# Patient Record
Sex: FEMALE | Race: WHITE | URBAN - METROPOLITAN AREA
[De-identification: names, ages, dates, MRNs, and addresses within clinical notes are randomized per-mention and may not be internally consistent; named-entity substitution may affect disease eponyms.]

---

## 2017-09-12 ENCOUNTER — APPOINTMENT (OUTPATIENT)
Dept: GENERAL RADIOLOGY | Facility: CLINIC | Age: 47
End: 2017-09-12
Attending: PHYSICIAN ASSISTANT
Payer: COMMERCIAL

## 2017-09-12 ENCOUNTER — HOSPITAL ENCOUNTER (EMERGENCY)
Facility: CLINIC | Age: 47
Discharge: HOME | End: 2017-09-12
Attending: PHYSICIAN ASSISTANT | Admitting: PHYSICIAN ASSISTANT
Payer: COMMERCIAL

## 2017-09-12 ENCOUNTER — APPOINTMENT (OUTPATIENT)
Dept: CT IMAGING | Facility: CLINIC | Age: 47
End: 2017-09-12
Attending: PHYSICIAN ASSISTANT
Payer: COMMERCIAL

## 2017-09-12 VITALS
BODY MASS INDEX: 19.7 KG/M2 | TEMPERATURE: 98.2 F | HEART RATE: 78 BPM | SYSTOLIC BLOOD PRESSURE: 125 MMHG | HEIGHT: 68 IN | DIASTOLIC BLOOD PRESSURE: 91 MMHG | RESPIRATION RATE: 20 BRPM | WEIGHT: 130 LBS | OXYGEN SATURATION: 98 %

## 2017-09-12 DIAGNOSIS — M54.50 ACUTE BILATERAL LOW BACK PAIN WITHOUT SCIATICA: ICD-10-CM

## 2017-09-12 DIAGNOSIS — R20.2 PARESTHESIA: ICD-10-CM

## 2017-09-12 LAB
ALBUMIN SERPL-MCNC: 3.7 G/DL (ref 3.4–5)
ALBUMIN UR-MCNC: NEGATIVE MG/DL
ALP SERPL-CCNC: 47 U/L (ref 40–150)
ALT SERPL W P-5'-P-CCNC: 17 U/L (ref 0–50)
ANION GAP SERPL CALCULATED.3IONS-SCNC: 8 MMOL/L (ref 3–14)
APPEARANCE UR: CLEAR
AST SERPL W P-5'-P-CCNC: 11 U/L (ref 0–45)
BASOPHILS # BLD AUTO: 0 10E9/L (ref 0–0.2)
BASOPHILS NFR BLD AUTO: 0.2 %
BILIRUB SERPL-MCNC: 0.3 MG/DL (ref 0.2–1.3)
BILIRUB UR QL STRIP: NEGATIVE
BUN SERPL-MCNC: 16 MG/DL (ref 7–30)
CALCIUM SERPL-MCNC: 8.4 MG/DL (ref 8.5–10.1)
CHLORIDE SERPL-SCNC: 108 MMOL/L (ref 94–109)
CO2 SERPL-SCNC: 25 MMOL/L (ref 20–32)
COLOR UR AUTO: YELLOW
CREAT SERPL-MCNC: 0.81 MG/DL (ref 0.52–1.04)
DIFFERENTIAL METHOD BLD: ABNORMAL
EOSINOPHIL # BLD AUTO: 0.1 10E9/L (ref 0–0.7)
EOSINOPHIL NFR BLD AUTO: 2.2 %
ERYTHROCYTE [DISTWIDTH] IN BLOOD BY AUTOMATED COUNT: 13.4 % (ref 10–15)
GFR SERPL CREATININE-BSD FRML MDRD: 75 ML/MIN/1.7M2
GLUCOSE SERPL-MCNC: 109 MG/DL (ref 70–99)
GLUCOSE UR STRIP-MCNC: NEGATIVE MG/DL
HCG UR QL: NEGATIVE
HCT VFR BLD AUTO: 34.8 % (ref 35–47)
HGB BLD-MCNC: 11.7 G/DL (ref 11.7–15.7)
HGB UR QL STRIP: NEGATIVE
IMM GRANULOCYTES # BLD: 0 10E9/L (ref 0–0.4)
IMM GRANULOCYTES NFR BLD: 0.2 %
KETONES UR STRIP-MCNC: ABNORMAL MG/DL
LEUKOCYTE ESTERASE UR QL STRIP: NEGATIVE
LIPASE SERPL-CCNC: 208 U/L (ref 73–393)
LYMPHOCYTES # BLD AUTO: 2 10E9/L (ref 0.8–5.3)
LYMPHOCYTES NFR BLD AUTO: 39.6 %
MCH RBC QN AUTO: 28.5 PG (ref 26.5–33)
MCHC RBC AUTO-ENTMCNC: 33.6 G/DL (ref 31.5–36.5)
MCV RBC AUTO: 85 FL (ref 78–100)
MONOCYTES # BLD AUTO: 0.4 10E9/L (ref 0–1.3)
MONOCYTES NFR BLD AUTO: 7.6 %
NEUTROPHILS # BLD AUTO: 2.5 10E9/L (ref 1.6–8.3)
NEUTROPHILS NFR BLD AUTO: 50.2 %
NITRATE UR QL: NEGATIVE
NRBC # BLD AUTO: 0 10*3/UL
NRBC BLD AUTO-RTO: 0 /100
PH UR STRIP: 5 PH (ref 5–7)
PLATELET # BLD AUTO: 166 10E9/L (ref 150–450)
POTASSIUM SERPL-SCNC: 3.6 MMOL/L (ref 3.4–5.3)
PROT SERPL-MCNC: 7 G/DL (ref 6.8–8.8)
RBC # BLD AUTO: 4.1 10E12/L (ref 3.8–5.2)
SODIUM SERPL-SCNC: 141 MMOL/L (ref 133–144)
SOURCE: ABNORMAL
SP GR UR STRIP: >1.03 (ref 1–1.03)
UROBILINOGEN UR STRIP-ACNC: 0.2 EU/DL (ref 0.2–1)
WBC # BLD AUTO: 5 10E9/L (ref 4–11)

## 2017-09-12 PROCEDURE — 99285 EMERGENCY DEPT VISIT HI MDM: CPT | Mod: 25

## 2017-09-12 PROCEDURE — 83690 ASSAY OF LIPASE: CPT | Performed by: PHYSICIAN ASSISTANT

## 2017-09-12 PROCEDURE — 74176 CT ABD & PELVIS W/O CONTRAST: CPT

## 2017-09-12 PROCEDURE — 81003 URINALYSIS AUTO W/O SCOPE: CPT | Performed by: PHYSICIAN ASSISTANT

## 2017-09-12 PROCEDURE — 72100 X-RAY EXAM L-S SPINE 2/3 VWS: CPT

## 2017-09-12 PROCEDURE — 80053 COMPREHEN METABOLIC PANEL: CPT | Performed by: PHYSICIAN ASSISTANT

## 2017-09-12 PROCEDURE — 85025 COMPLETE CBC W/AUTO DIFF WBC: CPT | Performed by: PHYSICIAN ASSISTANT

## 2017-09-12 ASSESSMENT — ENCOUNTER SYMPTOMS
ROS GI COMMENTS: NEGATIVE FOR INCONTINENCE.
BACK PAIN: 1
ABDOMINAL PAIN: 0

## 2017-09-12 NOTE — ED AVS SNAPSHOT
Emergency Department    [address] [facility] [address]    Phone:  [phone]    Fax:  [phone]                                       Addie Baron   MRN: [id number]    Department:   Emergency Department   Date of Visit:  9/12/2017           Patient Information     Date Of Birth          1970        Your diagnoses for this visit were:     Acute bilateral low back pain without sciatica     Paresthesia, all extremities        You were seen by Analy Guillaume PA-C.      Follow-up Information     Follow up with  Emergency Department.    Specialty:  EMERGENCY MEDICINE    Why:  If symptoms worsen    Contact information:    [address] [facility] [address] [phone]        Follow up with [facility] In 3 days.    Contact information:    [address]  [address]  [facility] [address]          Follow up with Ryan Ruiz MD In 3 days.    Specialty:  Orthopedics    Contact information:    [facility]  [address] [facility] [address] [phone]          Discharge Instructions       Rest, ice to back, ibuprofen.  Follow up with primary care in 3 days for recheck. Also discuss Vitamin B12 testing due to tingling.   Follow up with spine ortho if not improving in the next 5 days as further evaluation with MRI may be indicated.       Discharge Instructions  Back Pain  You were seen today for back pain. Back pain can have many causes, but most will get better without surgery or other specific treatment. Sometimes there is a herniated ( slipped ) disc. We don t usually do MRI scans to look for these right away, since most herniated discs will get better on their own with time.  Today, we did not find any evidence that your back pain was caused by a serious condition, such as an infection, fracture, or tumor. However, sometimes symptoms develop over time and cannot be found during an emergency visit, so it  is very important that you follow up with your primary doctor.  Return to the Emergency Department if:    You develop a fever with your back pain.     You have weakness or change in sensation in one or both legs.    You lose control of your bowels or bladder, or can t empty your bladder.    Your pain gets much worse.     Follow-up with your doctor:    Unless your pain has completely gone away, please make an appointment with your doctor within one week.  You may need further management of your back pain, such as more pain medication, imaging such as an X-ray or MRI, or physical therapy.    What can I do to help myself?    Remain Active -- People are often afraid that they will hurt their back further or delay recovery by remaining active, but this is one of the best things you can do for your back. In fact, prolonged bed rest is not recommended. Studies have shown that people with low back pain recover faster when they remain active. Movement helps to bring blood flow to the muscles and relieve muscle spasms as well as preventing loss of muscle strength.    Heat -- Using a heating pad can help with low back pain during the first few weeks. Do not sleep with a heating pad, as you can be burned.     Pain medications - You may take a pain medication such as Tylenol  (acetaminophen), Advil , Nuprin  (ibuprofen) or Aleve  (naproxen).  If you have been given a narcotic such as Vicodin  (hydrocodone with acetaminophen), Percocet  (oxycodone with acetaminophen), codeine, or a muscle relaxant such as Flexeril  (cyclobenzaprine) or Soma  (carisoprodol), do not drive for four hours after you have taken it. If the narcotic contains Tylenol  (acetaminophen), do not take Tylenol  with it. All narcotics will cause constipation, so eat a high fiber diet.   If you were given a prescription for medicine here today, be sure to read all of the information (including the package insert) that comes with your prescription.  This will  include important information about the medicine, its side effects, and any warnings that you need to know about.  The pharmacist who fills the prescription can provide more information and answer questions you may have about the medicine.  If you have questions or concerns that the pharmacist cannot address, please call or return to the Emergency Department.   Remember that you can always come back to the Emergency Department if you are not able to see your regular doctor in the amount of time listed above, if you get any new symptoms, or if there is anything that worries you.          24 Hour Appointment Hotline       To make an appointment at any [facility], call [phone] ([phone]). If you don't have a family doctor or clinic, we will help you find one. [facility] clinics are conveniently located to serve the needs of you and your family.             Review of your medicines      Notice     You have not been prescribed any medications.            Procedures and tests performed during your visit     *UA reflex to Microscopic    Abd/pelvis CT no contrast - Stone Protocol    CBC with platelets + differential    Comprehensive metabolic panel    HCG qualitative urine    IV access    Lipase    Lumbar spine XR, 2-3 views      Orders Needing Specimen Collection     None      Pending Results     Date and Time Order Name Status Description    9/12/2017 2104 Lumbar spine XR, 2-3 views Preliminary             Pending Culture Results     No orders found from 9/10/2017 to 9/13/2017.            Pending Results Instructions     If you had any lab results that were not finalized at the time of your Discharge, you can call the ED Lab Result RN at [phone]. You will be contacted by this team for any positive Lab results or changes in treatment. The nurses are available 7 days a week from 10A to 6:30P.  You can leave a message 24 hours per day and they will return your call.        Test Results From Your Hospital  Stay        9/12/2017  9:28 PM      Component Results     Component Value Ref Range & Units Status    WBC 5.0 4.0 - 11.0 10e9/L Final    RBC Count 4.10 3.8 - 5.2 10e12/L Final    Hemoglobin 11.7 11.7 - 15.7 g/dL Final    Hematocrit 34.8 (L) 35.0 - 47.0 % Final    MCV 85 78 - 100 fl Final    MCH 28.5 26.5 - 33.0 pg Final    MCHC 33.6 31.5 - 36.5 g/dL Final    RDW 13.4 10.0 - 15.0 % Final    Platelet Count 166 150 - 450 10e9/L Final    Diff Method Automated Method  Final    % Neutrophils 50.2 % Final    % Lymphocytes 39.6 % Final    % Monocytes 7.6 % Final    % Eosinophils 2.2 % Final    % Basophils 0.2 % Final    % Immature Granulocytes 0.2 % Final    Nucleated RBCs 0 0 /100 Final    Absolute Neutrophil 2.5 1.6 - 8.3 10e9/L Final    Absolute Lymphocytes 2.0 0.8 - 5.3 10e9/L Final    Absolute Monocytes 0.4 0.0 - 1.3 10e9/L Final    Absolute Eosinophils 0.1 0.0 - 0.7 10e9/L Final    Absolute Basophils 0.0 0.0 - 0.2 10e9/L Final    Abs Immature Granulocytes 0.0 0 - 0.4 10e9/L Final    Absolute Nucleated RBC 0.0  Final         9/12/2017  9:45 PM      Component Results     Component Value Ref Range & Units Status    Sodium 141 133 - 144 mmol/L Final    Potassium 3.6 3.4 - 5.3 mmol/L Final    Chloride 108 94 - 109 mmol/L Final    Carbon Dioxide 25 20 - 32 mmol/L Final    Anion Gap 8 3 - 14 mmol/L Final    Glucose 109 (H) 70 - 99 mg/dL Final    Urea Nitrogen 16 7 - 30 mg/dL Final    Creatinine 0.81 0.52 - 1.04 mg/dL Final    GFR Estimate 75 >60 mL/min/1.7m2 Final    Non  GFR Calc    GFR Estimate If Black >90 >60 mL/min/1.7m2 Final    African American GFR Calc    Calcium 8.4 (L) 8.5 - 10.1 mg/dL Final    Bilirubin Total 0.3 0.2 - 1.3 mg/dL Final    Albumin 3.7 3.4 - 5.0 g/dL Final    Protein Total 7.0 6.8 - 8.8 g/dL Final    Alkaline Phosphatase 47 40 - 150 U/L Final    ALT 17 0 - 50 U/L Final    AST 11 0 - 45 U/L Final         9/12/2017  9:42 PM      Component Results     Component Value Ref Range & Units  Status    Lipase 208 73 - 393 U/L Final         9/12/2017  9:33 PM      Component Results     Component Value Ref Range & Units Status    HCG Qual Urine Negative NEG^Negative Final    This test is for screening purposes.  Results should be interpreted along with   the clinical picture.  Confirmation testing is available if warranted by   ordering [id number], HCG Quantitative Pregnancy.           9/12/2017 10:27 PM      Narrative     CT ABDOMEN AND PELVIS WITHOUT CONTRAST  9/12/2017 9:31 PM     INDICATION: Low back pain, greater on left side.    TECHNIQUE: Thin axial images through the abdomen and pelvis without  contrast. Coronal reformatted images. Radiation dose for this scan was  reduced using automated exposure control, adjustment of the mA and/or  kV according to patient size, or iterative reconstruction technique.    COMPARISON: None.    FINDINGS: No urinary stones or hydronephrosis. Liver, gallbladder,  spleen, pancreas, adrenal glands and kidneys are negative without  benefit of contrast.    Uterus is present. Possible trace fluid in the right pelvis, within  normal physiologic limits. No inflammatory changes demonstrated. No  bowel obstruction, free air or other acute findings. No destructive  bone lesions. Mild curve convex to the left lumbar spine.        Impression     IMPRESSION:  1. No urinary stones or hydronephrosis.  2. No other acute findings within limits of a noncontrast scan.  3. Possible trace pelvic fluid, within normal physiologic limits.    PAUL GUSMAN MD         9/12/2017 10:01 PM      Narrative     XR LUMBAR SPINE 2-3 VIEWS  9/12/2017 9:50 PM     HISTORY:  low back discomfort    COMPARISON: None.    FINDINGS:  There is curvature of the spine convex towards the left. No  fractures are identified. Degenerative changes present with small  anterior osteophytes at L2-3, L3-4, and L4-5.        Impression     IMPRESSION:  1. No acute fractures.  2. Mild degenerative change.         9/12/2017   9:28 PM      Component Results     Component Value Ref Range & Units Status    Color Urine Yellow  Final    Appearance Urine Clear  Final    Glucose Urine Negative NEG^Negative mg/dL Final    Bilirubin Urine Negative NEG^Negative Final    Ketones Urine Trace (A) NEG^Negative mg/dL Final    Specific Gravity Urine >1.030 1.003 - 1.035 Final    Blood Urine Negative NEG^Negative Final    pH Urine 5.0 5.0 - 7.0 pH Final    Protein Albumin Urine Negative NEG^Negative mg/dL Final    Urobilinogen Urine 0.2 0.2 - 1.0 EU/dL Final    Nitrite Urine Negative NEG^Negative Final    Leukocyte Esterase Urine Negative NEG^Negative Final    Source Midstream Urine  Final                Clinical Quality Measure: Blood Pressure Screening     Your blood pressure was checked while you were in the emergency department today. The last reading we obtained was  BP: 141/88 . Please read the guidelines below about what these numbers mean and what you should do about them.  If your systolic blood pressure (the top number) is less than 120 and your diastolic blood pressure (the bottom number) is less than 80, then your blood pressure is normal. There is nothing more that you need to do about it.  If your systolic blood pressure (the top number) is 120-139 or your diastolic blood pressure (the bottom number) is 80-89, your blood pressure may be higher than it should be. You should have your blood pressure rechecked within a year by a primary care provider.  If your systolic blood pressure (the top number) is 140 or greater or your diastolic blood pressure (the bottom number) is 90 or greater, you may have high blood pressure. High blood pressure is treatable, but if left untreated over time it can put you at risk for heart attack, stroke, or kidney failure. You should have your blood pressure rechecked by a primary care provider within the next 4 weeks.  If your provider in the emergency department today gave you specific instructions to follow-up  "with your doctor or provider even sooner than that, you should follow that instruction and not wait for up to 4 weeks for your follow-up visit.        Thank you for choosing [facility]       Thank you for choosing [facility] for your care. Our goal is always to provide you with excellent care. Hearing back from our patients is one way we can continue to improve our services. Please take a few minutes to complete the written survey that you may receive in the mail after you visit with us. Thank you!        [employer] Information     [employer] lets you send messages to your doctor, view your test results, renew your prescriptions, schedule appointments and more. To sign up, go to [email or web address] . Click on \"Log in\" on the left side of the screen, which will take you to the Welcome page. Then click on \"Sign up Now\" on the right side of the page.     You will be asked to enter the access code listed below, as well as some personal information. Please follow the directions to create your username and password.     Your access code is: [email or web address]  Expires: 2017 10:33 PM     Your access code will  in 90 days. If you need help or a new code, please call your [facility] clinic or [phone].        Care EveryWhere ID     This is your Care EveryWhere ID. This could be used by other organizations to access your [facility] medical records  [id number]        Equal Access to Services     FREEDOM DUBON : Hadisidra Eden, waaxda héctor, qaybta jessicaalanamika tejada . So [facility] [phone].    ATENCIÓN: Si habla español, tiene a warner disposición servicios gratuitos de asistencia lingüística. Miguel A al [phone].    We comply with applicable federal civil rights laws and [place] laws. We do not discriminate on the basis of race, color, national origin, age, disability sex, sexual orientation or gender identity.            After Visit Summary       This is your " record. Keep this with you and show to your community pharmacist(s) and doctor(s) at your next visit.

## 2017-09-12 NOTE — ED AVS SNAPSHOT
Emergency Department    [address] [facility] [address]    Phone:  [phone]    Fax:  [phone]                                       Addie Baron   MRN: [id number]    Department:   Emergency Department   Date of Visit:  9/12/2017           After Visit Summary Signature Page     I have received my discharge instructions, and my questions have been answered. I have discussed any challenges I see with this plan with the nurse or doctor.    ..........................................................................................................................................  Patient/Patient Representative Signature      ..........................................................................................................................................  Patient Representative Print Name and Relationship to Patient    ..................................................               ................................................  Date                                            Time    ..........................................................................................................................................  Reviewed by Signature/Title    ...................................................              ..............................................  Date                                                            Time

## 2017-09-13 NOTE — ED PROVIDER NOTES
History     Chief Complaint:  Lower Back Pain      HPI   Addie Baron is a 47 year old female who presents to the emergency department today for evaluation of lower back pain. The patient states that for the past month she has been experiencing an internal pain and pressure in her lower back that is worse in the morning or when laying down. She endorses the pain radiating down her leg and into her pelvic area. She denies any injuries or new activities, though she notes that she has been working on her basement. The patient states that she was seen at the [facility] ED in July for abdominal pain where they found an ovarian cyst that has since resolved itself. However, she notes that this morning around 0500 she was woken up with pain in back that felt like a past kidney stone. This resolved, but she is concerned about the lower back pain so she came to the ED this evening. Additionally the patient states that she has felt some associated tingling in her upper and lower extremities. She denies any abdominal pain, saddle anesthesia, or bowel or bladder incontinence.     Allergies:  No Known Drug Allergies     Medications:    The patient is currently on no regular medications.       Past Medical History:    History reviewed.  No significant past medical history.     Past Surgical History:    History reviewed.  No significant past surgical history.     Family History:    History reviewed.  No significant family history.     Social History:  Smoking Status: negative     Review of Systems   Gastrointestinal: Negative for abdominal pain.        Negative for incontinence.    Genitourinary:        Negative for incontinence.   Musculoskeletal: Positive for back pain.   Neurological:        Positive for tingling.   All other systems reviewed and are negative.    Physical Exam   First Vitals:  Patient Vitals for the past 24 hrs:   BP Temp Temp src Pulse Resp SpO2 Height Weight   09/12/17 2045 141/88 98.2  F (36.8  " C) Oral 78 20 98 % 1.727 m (5' 8\") 59 kg (130 lb)     Physical Exam  Nursing note and vitals reviewed.     GENERAL: Alert, mild distress, non toxic appearing.   HEENT: Normal conjunctiva. No scleral icterus.   NECK: Supple, normal range of motion.  CARDIAC: Normal rate and regular rhythm. Normal heart sounds. No murmurs, rubs, or gallops appreciated. Intact distal pedal pulses 2+ and symmetric.   PULMONARY: CTA bilaterally. Normal breath sounds. No wheezing, crackles, or rhonchi appreciated.   ABDOMEN: Soft, non-tender, non-distended. No rebound or guarding.   NEURO: Alert and oriented. No cranial nerve deficit. Sensation intact throughout including saddle area. 5/5 strength of bilateral upper and lower extremities. Active DF/PF intact. Normal gait.   MUSCULOSKELETAL: No peripheral edema. No CVA tenderness. Mild tenderness over lower lumbar spine, no overlying erythema, edema or warmth over the spine. No pilonidal cyst present.   SKIN: Skin is warm and dry. No rashes. No pallor or jaundice.   PSYCH: Normal affect and mood.     Emergency Department Course   Imaging:  Radiographic findings were communicated with the patient who voiced understanding of the findings.    Abd/pelvis CT no contrast- stone protocol:  1. No urinary stones or hydronephrosis.  2. No other acute findings within limits of a noncontrast scan.  3. Possible trace pelvic fluid, within normal physiologic limits. As per radiology.    Lumbar spine XR, 2-3 views:  1. No acute fractures.  2. Mild degenerative change. As per radiology.    Laboratory:  UA with Microscopic: Trace URINEKETON (A)  HCG Qualitative: Negative    Lipase: 208    CBC: HCT 34.8 (L) o/w WNL. (WBC 5.0, HGB 11.7, )   CMP: Glucose 109 (H), Calcium 8.4 (L) o/w WNL. (Creatinine: 0.81)      Emergency Department Course:  Nursing notes and vitals reviewed. I performed an exam of the patient as documented above.     Blood drawn. This was sent to the lab for further testing, results " above.    The patient provided a urine sample here in the emergency department. This was sent for laboratory testing, findings above.     The patient was sent for the following imaging studies while in the emergency department: Abd/pelvis CT no contrast- stone protocol, Lumbar spine XR, 2-3 views.     Findings and plan explained to the Patient. Patient discharged home with instructions regarding supportive care, medications, and reasons to return. The importance of close follow-up was reviewed.    Impression & Plan    Medical Decision Making:  Addie Baron is a 47 year old female who presents with concern for approximately 4-weeks of low back discomfort which she describes as a pressure sensation. She also reports intermittent tingling in her upper and lower extremities. Here she is afebrile, hemodynamically stable, and non-toxic appearing. The patient did not have any high risk features, including fever, saddle anesthesia, or bowel or bladder dysfunction. There is no clinical evidence of acute cord compression, cauda equina syndrome, discitis, spinal hematoma or epidural abscess. She did comment on some tingling in her legs but this has been intermittent and also in her hands. She is neurovascularly intact on exam. Given this, no other red flags and the fact this has been ongoing for the past month, I do not feel emergent MRI is indicated due to my low suspicion for the above etiologies. Xray of lumbar spine does show some degenerative changes but no signs of acute fracture, subluxation or concerning bony mass. She has no abdominal tenderness, making any acute intraabdominal process unlikely. She recently had a follow up ultrasound (due to prior history of ovarian cyst) 6 days ago (9/6) that was normal. Given this, no abdominal tenderness, and the fact her pain was present over past four weeks, no indication for repeat ultrasound today. With her history of kidney stones, I did obtain CT stone  protocol. This showed trace physiologic fluid in pelvis but no signs of hydronephrosis, ureteral stone, concerning bony lesion, or any other acute intraabdominal process to explain her pain. She is not pregnant. Urine is negative for signs of infection. Kidney function WNL. No evidence of acute electrolyte abnormality or anemia. No signs of pilonidal cyst on exam.     At this time, the etiology of her pain is unclear, possibly piriformis syndrome/musculoskeletal or related to her mild degenerative changes in the spine. I did advise her to rest, try ibuprofen or tylenol for pain and ice or heat to the back. Recommended close follow up with primary care provider this week for recheck and possible further testing including vitamin D and vitamin B12. I also provided contact information for ortho spine as she may require further outpatient imaging if not improving. The patient was advised to avoid heavy lifting, bending, or twisting. Indications for return to seek emergent evaluation were reviewed, including increasing pain, numbness, focal weakness, bowel or bladder dysfunction, high fevers, or any other new concerns. The patient was in agreement with plan and discharged in satisfactory condition with all questions answered.      Diagnosis:    ICD-10-CM    1. Acute bilateral low back pain without sciatica M54.5    2. Paresthesia, all extremities R20.2       Disposition:  Discharged to home.     Kate GONZALEZ, am serving as a scribe on 9/12/2017 at 8:53 PM to personally document services performed by Analy Guillaume PA-C based on my observations and the provider's statements to me.     Kate Varghese  9/12/2017    EMERGENCY DEPARTMENT       Analy Guillaume PA-C  09/13/17 0909

## 2017-09-13 NOTE — DISCHARGE INSTRUCTIONS
Rest, ice to back, ibuprofen.  Follow up with primary care in 3 days for recheck. Also discuss Vitamin B12 testing due to tingling.   Follow up with spine ortho if not improving in the next 5 days as further evaluation with MRI may be indicated.       Discharge Instructions  Back Pain  You were seen today for back pain. Back pain can have many causes, but most will get better without surgery or other specific treatment. Sometimes there is a herniated ( slipped ) disc. We don t usually do MRI scans to look for these right away, since most herniated discs will get better on their own with time.  Today, we did not find any evidence that your back pain was caused by a serious condition, such as an infection, fracture, or tumor. However, sometimes symptoms develop over time and cannot be found during an emergency visit, so it is very important that you follow up with your primary doctor.  Return to the Emergency Department if:    You develop a fever with your back pain.     You have weakness or change in sensation in one or both legs.    You lose control of your bowels or bladder, or can t empty your bladder.    Your pain gets much worse.     Follow-up with your doctor:    Unless your pain has completely gone away, please make an appointment with your doctor within one week.  You may need further management of your back pain, such as more pain medication, imaging such as an X-ray or MRI, or physical therapy.    What can I do to help myself?    Remain Active -- People are often afraid that they will hurt their back further or delay recovery by remaining active, but this is one of the best things you can do for your back. In fact, prolonged bed rest is not recommended. Studies have shown that people with low back pain recover faster when they remain active. Movement helps to bring blood flow to the muscles and relieve muscle spasms as well as preventing loss of muscle strength.    Heat -- Using a heating pad can help with  low back pain during the first few weeks. Do not sleep with a heating pad, as you can be burned.     Pain medications - You may take a pain medication such as Tylenol  (acetaminophen), Advil , Nuprin  (ibuprofen) or Aleve  (naproxen).  If you have been given a narcotic such as Vicodin  (hydrocodone with acetaminophen), Percocet  (oxycodone with acetaminophen), codeine, or a muscle relaxant such as Flexeril  (cyclobenzaprine) or Soma  (carisoprodol), do not drive for four hours after you have taken it. If the narcotic contains Tylenol  (acetaminophen), do not take Tylenol  with it. All narcotics will cause constipation, so eat a high fiber diet.   If you were given a prescription for medicine here today, be sure to read all of the information (including the package insert) that comes with your prescription.  This will include important information about the medicine, its side effects, and any warnings that you need to know about.  The pharmacist who fills the prescription can provide more information and answer questions you may have about the medicine.  If you have questions or concerns that the pharmacist cannot address, please call or return to the Emergency Department.   Remember that you can always come back to the Emergency Department if you are not able to see your regular doctor in the amount of time listed above, if you get any new symptoms, or if there is anything that worries you.

## 2017-09-29 ENCOUNTER — HOSPITAL ENCOUNTER (EMERGENCY)
Facility: CLINIC | Age: 47
Discharge: HOME | End: 2017-09-29
Attending: EMERGENCY MEDICINE | Admitting: EMERGENCY MEDICINE
Payer: COMMERCIAL

## 2017-09-29 VITALS
BODY MASS INDEX: 19.7 KG/M2 | WEIGHT: 130 LBS | DIASTOLIC BLOOD PRESSURE: 82 MMHG | HEIGHT: 68 IN | HEART RATE: 74 BPM | OXYGEN SATURATION: 100 % | RESPIRATION RATE: 15 BRPM | SYSTOLIC BLOOD PRESSURE: 143 MMHG | TEMPERATURE: 98.4 F

## 2017-09-29 DIAGNOSIS — M54.50 ACUTE BILATERAL LOW BACK PAIN WITHOUT SCIATICA: Primary | ICD-10-CM

## 2017-09-29 PROCEDURE — 99282 EMERGENCY DEPT VISIT SF MDM: CPT

## 2017-09-29 ASSESSMENT — ENCOUNTER SYMPTOMS
DYSURIA: 0
UNEXPECTED WEIGHT CHANGE: 0
SHORTNESS OF BREATH: 0
BACK PAIN: 1
ABDOMINAL PAIN: 0
FEVER: 0

## 2017-09-29 NOTE — ED AVS SNAPSHOT
Emergency Department    [address] [facility] [address]    Phone:  [phone]    Fax:  [phone]                                       Addie Baron   MRN: [id number]    Department:   Emergency Department   Date of Visit:  9/29/2017           Patient Information     Date Of Birth          1970        Your diagnoses for this visit were:     Acute bilateral low back pain without sciatica        You were seen by Tyrone Denise MD.      Follow-up Information     Go to Your Primary Care Physician.    Why:  at previously scheduled appointment to discuss lingering back pain symptoms. Consider additional imaging with lumbar spine MRI.        Follow up with  Emergency Department.    Specialty:  EMERGENCY MEDICINE    Why:  If symptoms worsen    Contact information:    [address] [id number] [phone]        Discharge Instructions       Discharge Instructions  Back Pain  You were seen today for back pain. Back pain can have many causes, but most will get better without surgery or other specific treatment. Sometimes there is a herniated ( slipped ) disc. We do not usually do MRI scans to look for these right away, since most herniated discs will get better on their own with time.  Today, we did not find any evidence that your back pain was caused by a serious condition. However, sometimes symptoms develop over time and cannot be found during an emergency visit, so it is very important that you follow up with your primary provider.  Generally, every Emergency Department visit should have a follow-up clinic visit with either a primary or a specialty clinic/provider. Please follow-up as instructed by your emergency provider today.    Return to the Emergency Department if:    You develop a fever with your back pain.     You have weakness or change in sensation in one or both legs.    You lose control of your bowels or bladder, or cannot empty your  bladder (cannot pee).    Your pain gets much worse.     Follow-up with your provider:    Unless your pain has completely gone away, please make an appointment with your provider within one week. Most of the routine care for back pain is available in a clinic and not the Emergency Department. You may need further management of your back pain, such as more pain medication, imaging such as an X-ray or MRI, or physical therapy.    What can I do to help myself?    Remain Active -- People are often afraid that they will hurt their back further or delay recovery by remaining active, but this is one of the best things you can do for your back. In fact, staying in bed for a long time to rest is not recommended. Studies have shown that people with low back pain recover faster when they remain active. Movement helps to bring blood flow to the muscles and relieve muscle spasms as well as preventing loss of muscle strength.    Heat -- Using a heating pad can help with low back pain during the first few weeks. Do not sleep with a heating pad, as you can be burned.     Pain medications - You may take a pain medication such as Tylenol  (acetaminophen), Advil , Motrin  (ibuprofen) or Aleve  (naproxen).  If you were given a prescription for medicine here today, be sure to read all of the information (including the package insert) that comes with your prescription.  This will include important information about the medicine, its side effects, and any warnings that you need to know about.  The pharmacist who fills the prescription can provide more information and answer questions you may have about the medicine.  If you have questions or concerns that the pharmacist cannot address, please call or return to the Emergency Department.   Remember that you can always come back to the Emergency Department if you are not able to see your regular provider in the amount of time listed above, if you get any new symptoms, or if there is anything  that worries you.      24 Hour Appointment Hotline       To make an appointment at any [facility], call [phone] ([phone]). If you don't have a family doctor or clinic, we will help you find one. [facility] are conveniently located to serve the needs of you and your family.             Review of your medicines      Notice     You have not been prescribed any medications.            Orders Needing Specimen Collection     None      Pending Results     No orders found from 9/27/2017 to 9/30/2017.            Pending Culture Results     No orders found from 9/27/2017 to 9/30/2017.            Pending Results Instructions     If you had any lab results that were not finalized at the time of your Discharge, you can call the ED Lab Result RN at [phone]. You will be contacted by this team for any positive Lab results or changes in treatment. The nurses are available 7 days a week from 10A to 6:30P.  You can leave a message 24 hours per day and they will return your call.        Test Results From Your Hospital Stay               Clinical Quality Measure: Blood Pressure Screening     Your blood pressure was checked while you were in the emergency department today. The last reading we obtained was  BP: 143/82 . Please read the guidelines below about what these numbers mean and what you should do about them.  If your systolic blood pressure (the top number) is less than 120 and your diastolic blood pressure (the bottom number) is less than 80, then your blood pressure is normal. There is nothing more that you need to do about it.  If your systolic blood pressure (the top number) is 120-139 or your diastolic blood pressure (the bottom number) is 80-89, your blood pressure may be higher than it should be. You should have your blood pressure rechecked within a year by a primary care provider.  If your systolic blood pressure (the top number) is 140 or greater or your diastolic blood pressure (the bottom  "number) is 90 or greater, you may have high blood pressure. High blood pressure is treatable, but if left untreated over time it can put you at risk for heart attack, stroke, or kidney failure. You should have your blood pressure rechecked by a primary care provider within the next 4 weeks.  If your provider in the emergency department today gave you specific instructions to follow-up with your doctor or provider even sooner than that, you should follow that instruction and not wait for up to 4 weeks for your follow-up visit.        Thank you for choosing [facility]       Thank you for choosing [facility] for your care. Our goal is always to provide you with excellent care. Hearing back from our patients is one way we can continue to improve our services. Please take a few minutes to complete the written survey that you may receive in the mail after you visit with us. Thank you!        [employer] Information     [employer] lets you send messages to your doctor, view your test results, renew your prescriptions, schedule appointments and more. To sign up, go to [email or web address] . Click on \"Log in\" on the left side of the screen, which will take you to the Welcome page. Then click on \"Sign up Now\" on the right side of the page.     You will be asked to enter the access code listed below, as well as some personal information. Please follow the directions to create your username and password.     Your access code is: [email or web address]  Expires: 2017 10:33 PM     Your access code will  in 90 days. If you need help or a new code, please call your [facility] clinic or [phone].        Care EveryWhere ID     This is your Care EveryWhere ID. This could be used by other organizations to access your [facility] medical records  [id number]        Equal Access to Services     FREEDOM DUBON : kylah Dior qaybta kaalmada adeegyada, waxay idiin hayaan adeeg kharash la'aan ah. So wamaría elena " [phone].    ATENCIÓN: Si habla español, tiene a warner disposición servicios gratuitos de asistencia lingüística. Llame al [phone].    We comply with applicable federal civil rights laws and [place] laws. We do not discriminate on the basis of race, color, national origin, age, disability, sex, sexual orientation, or gender identity.            After Visit Summary       This is your record. Keep this with you and show to your community pharmacist(s) and doctor(s) at your next visit.

## 2017-09-29 NOTE — ED AVS SNAPSHOT
Emergency Department    [address] [facility] [address]    Phone:  [phone]    Fax:  [phone]                                       Addie Baron   MRN: [id number]    Department:   Emergency Department   Date of Visit:  9/29/2017           After Visit Summary Signature Page     I have received my discharge instructions, and my questions have been answered. I have discussed any challenges I see with this plan with the nurse or doctor.    ..........................................................................................................................................  Patient/Patient Representative Signature      ..........................................................................................................................................  Patient Representative Print Name and Relationship to Patient    ..................................................               ................................................  Date                                            Time    ..........................................................................................................................................  Reviewed by Signature/Title    ...................................................              ..............................................  Date                                                            Time

## 2017-09-30 NOTE — ED PROVIDER NOTES
History     Chief Complaint:  Back Pain     HPI   Addie Baron is a 47 year old female with a history of ovarian cysts who presents to the emergency department for evaluation of back pain. The patient states that she has had several weeks of atraumatic, daily low back/tailbone pain, which is generally exacerbated by laying flat and has associated shooting pains radiating down her bilateral lower extremities. The persistence and gradual worsening of her low back pain was concerning to her and prompted her ED visit today.     The patient has been taking ibuprofen intermittently for this issue, with some relief. She denies any recent fevers, unexpected weight change, chest pain, shortness of breath, abdominal pain, bladder or bowel incontinence, dysuria, vaginal bleeding or discharge, saddle anesthesia, familial history of colon cancer, or personal history of IV drug use. The patient denies any recent known injury or trauma to her back, though does state that she is currently remodeling her basement and has been doing more heavy lifting as of late. Of note, the patient states that she is an appointment with a primary care provider for this issue later this week.     Allergies:  NKDA     Medications:    The patient is currently on no regular medications.      Past Medical History:    Ovarian cysts    Past Surgical History:    The patient does not have any pertinent past surgical history  Family / Social History:    No past pertinent family history.    Social History:  Presents alone.   Negative for tobacco use.  Marital Status:   [2]    Review of Systems   Constitutional: Negative for fever and unexpected weight change.   Respiratory: Negative for shortness of breath.    Cardiovascular: Negative for chest pain.   Gastrointestinal: Negative for abdominal pain.   Genitourinary: Negative for dysuria, vaginal bleeding and vaginal discharge.   Musculoskeletal: Positive for back pain.        Positive  "for bilateral lower extremity discomfort.    All other systems reviewed and are negative.    Physical Exam   First Vitals:  BP: 143/82  Pulse: 74  Temp: 98.4  F (36.9  C)  Resp: 15  Height: 172.7 cm (5' 8\")  Weight: 59 kg (130 lb)  SpO2: 100 %      Physical Exam  General: The patient appears comfortable, anxious at bedside  Head:  The scalp, face, and head appear normal  Eyes:  The pupils are equal, round, and reactive to light    There is no nystagmus    Extraocular muscles are intact    Conjunctivae and sclerae are normal  ENT:    The nose is normal    Pinnae are normal    The oropharynx is normal    Uvula is in the midline  Neck:  Normal range of motion    There is no midline cervical spine pain/tenderness  CV:  Regular rate and underlying rhythm     Normal S1 and S2    No S3 or S4    No pathological murmur detected  Resp:  Lungs are clear    There is no tachypnea    Non-labored breathing    No rales    No wheezing   GI:  Abdomen is soft, there is no rigidity    No distension    No tympani    No rebound tenderness     Non-surgical without peritoneal features  MS:  Back:    The cervical and thoracic spine is non-tender in the midline    The lumbar spine is non-tender in the midline    There is no lumbar paraspinous muscular pain to palpation    Legs:    There is normal motor strength of bilateral lower extremities    There is no sensory abnormality/paresthesia    There is no numbness    There is no radiculopathy    There is normal capillary refill to the toes  Skin:  No rash or acute skin lesions noted.  No shingles noted.  Neuro:  Speech is normal and fluent  Psych: Awake. Alert.  Normal affect.  Appropriate interactions.  Lymph: No anterior or posterior cervical lymphadenopathy noted      Emergency Department Course   Emergency Department Course:  Nursing notes and vitals reviewed. 2150 I performed an exam of the patient as documented above.     Findings and plan explained to the Patient. Patient discharged home " with instructions regarding supportive care, medications, and reasons to return. The importance of close follow-up was reviewed.     Impression & Plan    Medical Decision Making:  Addie Baron presented with back pain and intermittent bilateral radicular symptoms.  She did not sustain any trauma, therefore x-rays are not necessary due to the low likelihood of fracture or subluxation. Patient also recently with L spine xrays and CT abd/pelvis on Sept 12, 2017 with no acute findings noted on either exam.      Advanced imaging with CT/MRI is not indicated at this time, but may be indicated in the future if symptoms fail to resolve.  The patient has not had a fever, saddle/perineal anesthesia, bilateral foot numbness, or bowel or bladder dysfunction.  There is no clinical evidence of cauda equina syndrome, discitis, spinal/epidural space hematoma or abscess. The neurological exam is normal, with the exception of the dermatomal symptoms noted herein.      She has no abdominal pain, flank pain, dysuria, vaginal discharge/bleeding, low concern for intra-abdominal pathologies or sinister pathologies, thus no additional laboratory or urinalysis work up followed.    Addie was advised that radiculopathy often takes significant time to resolve, and that she should follow up with primary care later this week as scheduled. She will be discharged with recommendation to use ibuprofen or Tylenol as needed for pain..  The patient was instructed to avoid heavy lifting, bending or twisting.  It was noted that the patient should return for increasing pain, muscular weakness, or bowel or bladder dysfunction.  Will establish care with PCP this week and consider MRI if symptoms progress or worsen over the next weeks/months.    Diagnosis:    ICD-10-CM   1. Acute bilateral low back pain without sciatica M54.5     Disposition:  discharged to home    Sadia GONZALEZ am serving as a scribe on 9/29/2017 at 9:49 PM to personally  document services performed by Tyrone Denise MD based on my observations and the provider's statements to me.     Sadia De La Vega  9/29/2017    EMERGENCY DEPARTMENT       Tyrone Denise MD  09/30/17 0055

## 2018-01-28 ENCOUNTER — HOSPITAL ENCOUNTER (EMERGENCY)
Facility: CLINIC | Age: 48
Discharge: HOME | End: 2018-01-28
Attending: EMERGENCY MEDICINE | Admitting: EMERGENCY MEDICINE
Payer: COMMERCIAL

## 2018-01-28 VITALS
HEIGHT: 68 IN | DIASTOLIC BLOOD PRESSURE: 78 MMHG | SYSTOLIC BLOOD PRESSURE: 131 MMHG | OXYGEN SATURATION: 98 % | WEIGHT: 130 LBS | TEMPERATURE: 98.1 F | BODY MASS INDEX: 19.7 KG/M2 | HEART RATE: 93 BPM | RESPIRATION RATE: 16 BRPM

## 2018-01-28 DIAGNOSIS — B34.9 VIRAL SYNDROME: ICD-10-CM

## 2018-01-28 PROCEDURE — 99284 EMERGENCY DEPT VISIT MOD MDM: CPT | Mod: 25

## 2018-01-28 PROCEDURE — 25000132 ZZH RX MED GY IP 250 OP 250 PS 637: Performed by: EMERGENCY MEDICINE

## 2018-01-28 PROCEDURE — 25000128 H RX IP 250 OP 636: Performed by: EMERGENCY MEDICINE

## 2018-01-28 PROCEDURE — 96360 HYDRATION IV INFUSION INIT: CPT

## 2018-01-28 RX ORDER — ACETAMINOPHEN 500 MG
1000 TABLET ORAL ONCE
Status: COMPLETED | OUTPATIENT
Start: 2018-01-28 | End: 2018-01-28

## 2018-01-28 RX ADMIN — ACETAMINOPHEN 1000 MG: 500 TABLET, FILM COATED ORAL at 07:51

## 2018-01-28 RX ADMIN — SODIUM CHLORIDE 1000 ML: 9 INJECTION, SOLUTION INTRAVENOUS at 07:52

## 2018-01-28 ASSESSMENT — ENCOUNTER SYMPTOMS
DYSURIA: 0
CHILLS: 1
DIARRHEA: 1
LIGHT-HEADEDNESS: 1
SHORTNESS OF BREATH: 0
NAUSEA: 1
COUGH: 1

## 2018-01-28 NOTE — ED AVS SNAPSHOT
"  Emergency Department    [address] [facility] [address]    Phone:  [phone]    Fax:  [phone]                                       Addie Baron   MRN: [id number]    Department:   Emergency Department   Date of Visit:  1/28/2018           Patient Information     Date Of Birth          1970        Your diagnoses for this visit were:     Viral syndrome        You were seen by Adarsh Alejandro MD.      Follow-up Information     Follow up with [facility]. Schedule an appointment as soon as possible for a visit in 2 days.    Why:  As needed, For repeat evaluation and symptom check    Contact information:    [address] [place] [address]  [id number]  [facility] [address] [phone]          Follow up with  Emergency Department.    Specialty:  EMERGENCY MEDICINE    Why:  If symptoms worsen    Contact information:    [address] [facility] [id number] [phone]        Discharge Instructions         Viral Syndrome (Adult)  A viral illness may cause a number of symptoms. The symptoms depend on the part of the body that the virus affects. If it settles in your nose, throat, and lungs, it may cause cough, sore throat, congestion, and sometimes headache. If it settles in your stomach and intestinal tract, it may cause vomiting and diarrhea. Sometimes it causes vague symptoms like \"aching all over,\" feeling tired, loss of appetite, or fever.  A viral illness usually lasts 1 to 2 weeks, but sometimes it lasts longer. In some cases, a more serious infection can look like a viral syndrome in the first few days of the illness. You may need another exam and additional tests to know the difference. Watch for the warning signs listed below.  Home care  Follow these guidelines for taking care of yourself at home:    If symptoms are severe, rest at home for the first 2 to 3 days.    Stay away from cigarette smoke - both your smoke and " the smoke from others.    You may use over-the-counter acetaminophen or ibuprofen for fever, muscle aching, and headache, unless another medicine was prescribed for this. If you have chronic liver or kidney disease or ever had a stomach ulcer or GI bleeding, talk with your doctor before using these medicines. No one who is younger than 18 and ill with a fever should take aspirin. It may cause severe disease or death.    Your appetite may be poor, so a light diet is fine. Avoid dehydration by drinking 8 to 12 8-ounce glasses of fluids each day. This may include water; orange juice; lemonade; apple, grape, and cranberry juice; clear fruit drinks; electrolyte replacement and sports drinks; and decaffeinated teas and coffee. If you have been diagnosed with a kidney disease, ask your doctor how much and what types of fluids you should drink to prevent dehydration. If you have kidney disease, drinking too much fluid can cause it build up in the your body and be dangerous to your health.    Over-the-counter remedies won't shorten the length of the illness but may be helpful for cough, sore throat; and nasal and sinus congestion. Don't use decongestants if you have high blood pressure.  Follow-up care  Follow up with your healthcare provider if you do not improve over the next week.  Call 911  Get emergency medical care if any of the following occur:    Convulsion    Feeling weak, dizzy, or like you are going to faint    Chest pain, shortness of breath, wheezing, or difficulty breathing  When to seek medical advice  Call your healthcare provider right away if any of these occur:    Cough with lots of colored sputum (mucus) or blood in your sputum    Chest pain, shortness of breath, wheezing, or difficulty breathing    Severe headache; face, neck, or ear pain    Severe, constant pain in the lower right side of your belly (abdominal)    Continued vomiting (can t keep liquids down)    Frequent diarrhea (more than 5 times a  day); blood (red or black color) or mucus in diarrhea    Feeling weak, dizzy, or like you are going to faint    Extreme thirst    Fever of 100.4 F (38 C) or higher, or as directed by your healthcare provider  Date Last Reviewed: 9/25/2015 2000-2017 [employer]. [address] [place] [address]. All rights reserved. This information is not intended as a substitute for professional medical care. Always follow your healthcare professional's instructions.          24 Hour Appointment Hotline       To make an appointment at any [facility], call [phone] ([phone]). If you don't have a family doctor or clinic, we will help you find one. [facility] are conveniently located to serve the needs of you and your family.             Review of your medicines      Notice     You have not been prescribed any medications.            Orders Needing Specimen Collection     None      Pending Results     No orders found from 1/26/2018 to 1/29/2018.            Pending Culture Results     No orders found from 1/26/2018 to 1/29/2018.            Pending Results Instructions     If you had any lab results that were not finalized at the time of your Discharge, you can call the ED Lab Result RN at [phone]. You will be contacted by this team for any positive Lab results or changes in treatment. The nurses are available 7 days a week from 10A to 6:30P.  You can leave a message 24 hours per day and they will return your call.        Test Results From Your Hospital Stay               Clinical Quality Measure: Blood Pressure Screening     Your blood pressure was checked while you were in the emergency department today. The last reading we obtained was  BP: 131/78 . Please read the guidelines below about what these numbers mean and what you should do about them.  If your systolic blood pressure (the top number) is less than 120 and your diastolic blood pressure (the bottom number) is less than  "80, then your blood pressure is normal. There is nothing more that you need to do about it.  If your systolic blood pressure (the top number) is 120-139 or your diastolic blood pressure (the bottom number) is 80-89, your blood pressure may be higher than it should be. You should have your blood pressure rechecked within a year by a primary care provider.  If your systolic blood pressure (the top number) is 140 or greater or your diastolic blood pressure (the bottom number) is 90 or greater, you may have high blood pressure. High blood pressure is treatable, but if left untreated over time it can put you at risk for heart attack, stroke, or kidney failure. You should have your blood pressure rechecked by a primary care provider within the next 4 weeks.  If your provider in the emergency department today gave you specific instructions to follow-up with your doctor or provider even sooner than that, you should follow that instruction and not wait for up to 4 weeks for your follow-up visit.        Thank you for choosing [facility]       Thank you for choosing [facility] for your care. Our goal is always to provide you with excellent care. Hearing back from our patients is one way we can continue to improve our services. Please take a few minutes to complete the written survey that you may receive in the mail after you visit with us. Thank you!        [employer] Information     [employer] lets you send messages to your doctor, view your test results, renew your prescriptions, schedule appointments and more. To sign up, go to [email or web address] . Click on \"Log in\" on the left side of the screen, which will take you to the Welcome page. Then click on \"Sign up Now\" on the right side of the page.     You will be asked to enter the access code listed below, as well as some personal information. Please follow the directions to create your username and password.     Your access code is: [email or web address]  Expires: 4/28/2018  9:09 AM   "   Your access code will  in 90 days. If you need help or a new code, please call your [facility] clinic or [phone].        Care EveryWhere ID     This is your Care EveryWhere ID. This could be used by other organizations to access your [facility] medical records  [id number]        Equal Access to Services     FREEDOM DUBON : Hadii aad ku hadasho Sotiffanyali, waaxda luqadaha, qaybta kaalmada home, anamika wadsworth. So [facility] [place] [phone].    ATENCIÓN: Si habla español, tiene a warner disposición servicios gratuitos de asistencia lingüística. Llame al [phone].    We comply with applicable federal civil rights laws and [place] laws. We do not discriminate on the basis of race, color, national origin, age, disability, sex, sexual orientation, or gender identity.            After Visit Summary       This is your record. Keep this with you and show to your community pharmacist(s) and doctor(s) at your next visit.

## 2018-01-28 NOTE — ED AVS SNAPSHOT
Emergency Department    [address] [facility] [address]    Phone:  [phone]    Fax:  [phone]                                       Addie Baron   MRN: [id number]    Department:   Emergency Department   Date of Visit:  1/28/2018           After Visit Summary Signature Page     I have received my discharge instructions, and my questions have been answered. I have discussed any challenges I see with this plan with the nurse or doctor.    ..........................................................................................................................................  Patient/Patient Representative Signature      ..........................................................................................................................................  Patient Representative Print Name and Relationship to Patient    ..................................................               ................................................  Date                                            Time    ..........................................................................................................................................  Reviewed by Signature/Title    ...................................................              ..............................................  Date                                                            Time

## 2018-01-28 NOTE — ED PROVIDER NOTES
"  History     Chief Complaint:  Flu Symptoms    HPI   Addie Baron is a 47 year old female who presents with influenza symptoms. The patient reports to have had diarrhea, nausea, productive cough, and congestion symptoms for a week and began to take OTC cold medicine last Friday night. The next morning, the patient reports that she felt light headed and hypertensive with fever, cough and cold sweats and noticed red blotchiness along her thighs, prompting her visit to the ED. She further notes that she has taken Tylenol to relieve her fever. The patient denies dysuria or pain with deep breaths. She also notes that she has been exposed to her , who has had similar symptoms.     Allergies:  No known drug allergies    Medications:    The patient is currently on no regular medications.    Past Medical History:    The patient does not have any past pertinent medical history.    Past Surgical History:    History reviewed. No pertinent surgical history.    Family History:    History reviewed. No pertinent family history.     Social History:  Marital Status:     Tobacco Status: Never  Alcohol Use: Yes     Review of Systems   Constitutional: Positive for chills.   HENT: Positive for congestion. Hearing loss: No Pain With Deep Breaths.    Respiratory: Positive for cough. Negative for shortness of breath.    Gastrointestinal: Positive for diarrhea and nausea.   Genitourinary: Negative for dysuria.   Skin: Positive for rash.   Neurological: Positive for light-headedness.   All other systems reviewed and are negative.    Physical Exam     Patient Vitals for the past 24 hrs:   BP Temp Temp src Pulse Resp SpO2 Height Weight   01/28/18 0718 131/78 98.1  F (36.7  C) Oral 93 16 98 % 1.727 m (5' 8\") 59 kg (130 lb)     Physical Exam  Vitals: reviewed by me  General: Pt seen on [facility], pleasant, cooperative, and alert to conversation nontoxic, well-appearing.  Eyes: Tracking well, clear conjunctiva " BL  ENT: MMM, midline trachea.  Oropharynx unremarkable, patient is coughing frequently however.  Lymph: No lymphadenopathy or masses noted  Lungs:   No tachypnea, no accessory muscle use. No respiratory distress.  Lungs are clear to auscultation bilaterally  CV: Rate as above, regular rhythm.    Abd: Soft, non tender, no guarding, no rebound. Non distended  MSK: no peripheral edema or joint effusion.  No evidence of trauma  Skin: No rash, normal turgor and temperature, specifically we will note no blotchy erythema seen.  No maculopapular rash seen.  No vesicles seen.  Neuro: Clear speech and no facial droop.  Moving all extremities spontaneously  Psych: Not RIS, no e/o AH/VH    Emergency Department Course     Interventions:  0751: Tylenol 1000 mg PO  0752: 0.9% Sodium Chloride Bolus 1000 ml IV    Emergency Department Course:  Past medical records, nursing notes, and vitals reviewed.  0735: I performed an exam of the patient and obtained history, as documented above.  Intravenous fluids were administered, normal saline 1000 cc's.  0909: I rechecked the patient. Findings and plan explained to the Patient. Patient discharged home with instructions regarding supportive care, medications, and reasons to return. The importance of close follow-up was reviewed.     Impression & Plan      Medical Decision Making:  Addie Baron is a 47 year old female who presents to the emergency room with cough and cold symptoms, as well as subjective fevers and blotchy rash that comes and goes. The patient's presentation is most consistent with viral syndrome, she has no underlying comorbidities, did not see any indication to treat it she would not be a Tamiflu candidate. Reassuring, patient has normal vital signs here in the emergency room, has a normal cardiopulmonary exam with no evidence of respiratory distress or consolidation, therefore I do not think she has pneumonia. Patient is very soft abdomen with no guarding or  rebound or focal tenderness, therefore I do not feel an abdominal catastrophe will be the cause of the patient s subjective tactile fevers and fatigue, adding to that her respiratory symptoms again I do feel she likely has a viral syndrome and possibly influenza. Patient says she feels improved here with IV fluids and Tylenol only, I feel comfortable discharging the patient home to follow up with her primary care doctor, we have gone over very clear return to ED precautions, specifically fever not controlled with Tylenol or Motrin or if she begins to feel dehydrated and extra weak. Patient is okay with this plan, will note specifically no evidence of toxic shock syndrome as the patient has only had her tampon in for six hours and again has no clinical evidence of this diagnosis. Will discharge her as above.     Diagnosis:    ICD-10-CM    1. Viral syndrome B34.9        Disposition:  discharged to home    Pio Ortiz  1/28/2018    EMERGENCY DEPARTMENT  I, Pio Ortiz, am serving as a scribe at 7:35 AM on 1/28/2018 to document services personally performed by Adarsh Alejandro based on my observations and the provider's statements to me.         Adarsh Alejandro MD  01/29/18 1498

## 2019-08-28 NOTE — DISCHARGE INSTRUCTIONS
